# Patient Record
Sex: FEMALE | Race: WHITE | Employment: STUDENT | URBAN - METROPOLITAN AREA
[De-identification: names, ages, dates, MRNs, and addresses within clinical notes are randomized per-mention and may not be internally consistent; named-entity substitution may affect disease eponyms.]

---
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
## 2024-07-23 ENCOUNTER — TELEPHONE (OUTPATIENT)
Dept: HEMATOLOGY/ONCOLOGY | Facility: HOSPITAL | Age: 19
End: 2024-07-23

## 2024-07-23 NOTE — TELEPHONE ENCOUNTER
Pt is calling to schedule a new consult appt.    New Consult- Any doctor first available  Referred by- From [facility]  Reason- blood clotting disorder  Insurance-[employer] PPO    Please give pt a call back. Thank you.

## 2024-08-08 NOTE — PROGRESS NOTES
[facility] Note    Visit Diagnosis:  1. Abnormal laboratory test    2. Chronic fatigue        History of Present Illness: 19F referred by [facility] for blood clotting disorder. She states that she had a 42 day long period which prompted work up. Pelvs US normal. Brief episode of hematochezia but colonoscopy was normal. No epistaxis or gum bleeding.  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 She was told to consider RED-S syndrome. No fevers, night sweats or weight loss. No FH of bleeding, hemophilia or VWF.     Labs from OB/GYN  FVIII: 43% (normal )  VWF A (normal %), Ristocetin cofactor: 35 (normal )  PTT 32: normal  ATIII 109, Protein C 97, Protein S 89  MTHFR: heterozygote positive  Normal cardiolipins, B2GP  Normal prothrombin gene 17781E  Negative KATE  WBC 5.3, Hb 13, Plt 174, Ferritin 41    Review of Systems: 12 Point ROS was completed and pertinent positives are in the HPI    Current Outpatient Medications on File Prior to Visit   Medication Sig Dispense Refill    azelastine 0.1 % Nasal Solution       FLUoxetine 20 MG Oral Cap Take 1 capsule (20 mg total) by mouth daily.      albuterol 108 (90 Base) MCG/ACT Inhalation Aero Soln Inhale 2 puffs into the lungs 4 (four) times daily.      ADVAIR HFA 45-21 MCG/ACT Inhalation Aerosol Inhale 2 puffs into the lungs 2 (two) times daily.      montelukast 10 MG Oral Tab Take 1 tablet (10 mg total) by mouth daily.       No current facility-administered medications on file prior to visit.     Past Medical History:    Anxiety    Bloating    Blood in the stool    Chest pain    Food intolerance    Stress    Wheezing     Past Surgical History:   Procedure Laterality Date    Colonoscopy       Social History     Socioeconomic History    Marital status: Single   Tobacco Use    Smoking status: Never    Smokeless tobacco: Never   Vaping Use    Vaping status: Never Used   Substance and Sexual Activity    Alcohol  use: Never    Drug use: Never      Family History   Problem Relation Age of Onset    Other (Other) Mother         MTHFR    Cancer Paternal Grandmother         uterine    Diabetes Paternal Grandfather        Physical Exam  Height: 175.2 cm (5' 8.98\") (08/13 1357)  Weight: 67.1 kg (148 lb) (08/13 1357)  BSA (Calculated - sq m): 1.82 sq meters (08/13 1357)  Pulse: 68 (08/13 1357)  BP: 96/57 (08/13 1357)  Temp: 98 °F (36.7 °C) (08/13 1357)  Do Not Use - Resp Rate: --  SpO2: 95 % (08/13 1357)    General: NAD, AOX3  HEENT: clear op, mmm, no jvd, no scleral icterus  CV: RR  Extremities: No edema   Lungs: , no increased work of breathing  Abd: non distended   Neuro: CN: II-XII grossly intact    Results:  Lab Results   Component Value Date    WBC 5.5 06/01/2023    HGB 13.2 06/01/2023    HCT 39.2 06/01/2023    MCV 90.5 06/01/2023     06/01/2023     Lab Results   Component Value Date     06/01/2023    K 4.0 06/01/2023    CO2 24 06/01/2023     06/01/2023    BUN 9 06/01/2023    ALB 4.4 06/01/2023       No results found for: \"LDH\"    Radiology: reviewed     Assessment and Plan:  Low Ferritin  -Will repeat CBC, Fe studies today. She takes PO iron BID. She will start taking this with vitamin C  -If low, she would like to consider IV InFED.    MTHFR Heterozygote: we discussed that this done cause an elevated risk of thrombosis. Ok to take multivitamin with folic acid. No further work up.     Mildly low VWF Ag and Ristocetnin cofactor: both levels are above 30 inconsistent with VWD. Will repeat levels and multimers today    Single episode of menorrhagia: repeat PT/PTT/Von willebrand panel. Normal Pelvic US. If recurrent, can add PFA and platelet aggregation studies.    She is concerned about RED-S. Will refer to endocrinology.    RTC pending above    [facility]

## 2024-08-13 ENCOUNTER — OFFICE VISIT (OUTPATIENT)
Dept: HEMATOLOGY/ONCOLOGY | Facility: HOSPITAL | Age: 19
End: 2024-08-13
Attending: INTERNAL MEDICINE
Payer: COMMERCIAL

## 2024-08-13 VITALS
SYSTOLIC BLOOD PRESSURE: 96 MMHG | DIASTOLIC BLOOD PRESSURE: 57 MMHG | OXYGEN SATURATION: 95 % | BODY MASS INDEX: 21.92 KG/M2 | WEIGHT: 148 LBS | RESPIRATION RATE: 14 BRPM | TEMPERATURE: 98 F | HEART RATE: 68 BPM | HEIGHT: 68.98 IN

## 2024-08-13 DIAGNOSIS — R53.82 CHRONIC FATIGUE: ICD-10-CM

## 2024-08-13 DIAGNOSIS — R89.9 ABNORMAL LABORATORY TEST: Primary | ICD-10-CM

## 2024-08-13 PROBLEM — D64.89 OTHER SPECIFIED ANEMIAS: Status: ACTIVE | Noted: 2024-08-13

## 2024-08-13 PROCEDURE — 99245 OFF/OP CONSLTJ NEW/EST HI 55: CPT | Performed by: INTERNAL MEDICINE

## 2024-08-13 RX ORDER — AZELASTINE 1 MG/ML
SPRAY, METERED NASAL
COMMUNITY
Start: 2024-08-09

## 2024-08-13 NOTE — PROGRESS NOTES
Patient here as a new consult for blood clotting issues. States she had a 42 day long menstrual cycle earlier this year. Is having intermittent bleeding between menstrual cycles. Energy levels are low. Has occasional lightheadedness. Had some blood in the stool last year. Up to date with GI.

## 2024-08-15 ENCOUNTER — OFFICE VISIT (OUTPATIENT)
Dept: HEMATOLOGY/ONCOLOGY | Facility: HOSPITAL | Age: 19
End: 2024-08-15
Attending: INTERNAL MEDICINE
Payer: COMMERCIAL

## 2024-08-15 VITALS
HEART RATE: 64 BPM | RESPIRATION RATE: 14 BRPM | HEIGHT: 68.98 IN | BODY MASS INDEX: 22.04 KG/M2 | TEMPERATURE: 98 F | WEIGHT: 148.81 LBS | OXYGEN SATURATION: 98 % | DIASTOLIC BLOOD PRESSURE: 62 MMHG | SYSTOLIC BLOOD PRESSURE: 112 MMHG

## 2024-08-15 DIAGNOSIS — D64.89 ANEMIA DUE TO OTHER CAUSE, NOT CLASSIFIED: Primary | ICD-10-CM

## 2024-08-15 PROCEDURE — 96365 THER/PROPH/DIAG IV INF INIT: CPT

## 2024-08-15 NOTE — PROGRESS NOTES
Education Record    Learner:  Patient    Disease / Diagnosis:Iron deficiency    Barriers / Limitations:  None   Comments:    Method:  Brief focused and Reinforcement   Comments:    General Topics:  Procedure and Plan of care reviewed   Comments:    Outcome:  Shows understanding   Comments:    Pt received test dose and remaining infed without complication.  Pt discharged in stable condition.  Sent message to Dr Orr's nurse to see when pt to follow-up with MD-instructed pt to look for a [employer] message to be sent to her.

## 2024-08-20 ENCOUNTER — TELEPHONE (OUTPATIENT)
Dept: HEMATOLOGY/ONCOLOGY | Facility: HOSPITAL | Age: 19
End: 2024-08-20

## 2024-08-20 NOTE — TELEPHONE ENCOUNTER
Patient's mother is calling to schedule infusion appointment. She asked if she can get a call back today because tomorrow she will be at work.  Ying [phone]  Called 8/20/24. MP

## 2024-11-27 ENCOUNTER — NURSE ONLY (OUTPATIENT)
Dept: HEMATOLOGY/ONCOLOGY | Facility: HOSPITAL | Age: 19
End: 2024-11-27
Attending: INTERNAL MEDICINE
Payer: COMMERCIAL

## 2024-11-27 ENCOUNTER — TELEPHONE (OUTPATIENT)
Dept: HEMATOLOGY/ONCOLOGY | Facility: HOSPITAL | Age: 19
End: 2024-11-27

## 2024-11-27 DIAGNOSIS — D64.89 ANEMIA DUE TO OTHER CAUSE, NOT CLASSIFIED: Primary | ICD-10-CM

## 2024-11-27 LAB
BASOPHILS # BLD AUTO: 0.04 X10(3) UL (ref 0–0.2)
BASOPHILS NFR BLD AUTO: 0.5 %
DEPRECATED HBV CORE AB SER IA-ACNC: 172 NG/ML
EOSINOPHIL # BLD AUTO: 0.1 X10(3) UL (ref 0–0.7)
EOSINOPHIL NFR BLD AUTO: 1.3 %
ERYTHROCYTE [DISTWIDTH] IN BLOOD BY AUTOMATED COUNT: 12.8 %
HCT VFR BLD AUTO: 38.4 %
HGB BLD-MCNC: 13.2 G/DL
IMM GRANULOCYTES # BLD AUTO: 0.02 X10(3) UL (ref 0–1)
IMM GRANULOCYTES NFR BLD: 0.3 %
IRON SATN MFR SERPL: 24 %
IRON SERPL-MCNC: 58 UG/DL
LYMPHOCYTES # BLD AUTO: 1.96 X10(3) UL (ref 1–4)
LYMPHOCYTES NFR BLD AUTO: 26.4 %
MCH RBC QN AUTO: 30.8 PG (ref 26–34)
MCHC RBC AUTO-ENTMCNC: 34.4 G/DL (ref 31–37)
MCV RBC AUTO: 89.5 FL
MONOCYTES # BLD AUTO: 0.64 X10(3) UL (ref 0.1–1)
MONOCYTES NFR BLD AUTO: 8.6 %
NEUTROPHILS # BLD AUTO: 4.67 X10 (3) UL (ref 1.5–7.7)
NEUTROPHILS # BLD AUTO: 4.67 X10(3) UL (ref 1.5–7.7)
NEUTROPHILS NFR BLD AUTO: 62.9 %
PLATELET # BLD AUTO: 171 10(3)UL (ref 150–450)
RBC # BLD AUTO: 4.29 X10(6)UL
TOTAL IRON BINDING CAPACITY: 238 UG/DL (ref 250–425)
TRANSFERRIN SERPL-MCNC: 176 MG/DL (ref 250–380)
WBC # BLD AUTO: 7.4 X10(3) UL (ref 4–11)

## 2024-11-27 PROCEDURE — 36415 COLL VENOUS BLD VENIPUNCTURE: CPT

## 2024-11-27 PROCEDURE — 82728 ASSAY OF FERRITIN: CPT

## 2024-11-27 PROCEDURE — 83540 ASSAY OF IRON: CPT

## 2024-11-27 PROCEDURE — 85025 COMPLETE CBC W/AUTO DIFF WBC: CPT

## 2024-11-27 PROCEDURE — 83550 IRON BINDING TEST: CPT

## 2024-11-27 NOTE — TELEPHONE ENCOUNTER
LVM for patient. No iron needed per APN. Will cancel Friday's infusion appointment once patient confirms message has been received. MyChart sent as well.

## 2024-11-29 ENCOUNTER — APPOINTMENT (OUTPATIENT)
Dept: HEMATOLOGY/ONCOLOGY | Facility: HOSPITAL | Age: 19
End: 2024-11-29
Attending: INTERNAL MEDICINE
Payer: COMMERCIAL

## (undated) NOTE — ED AVS SNAPSHOT
Parent/Legal Guardian Access to the Online [employer] Record of a Patient 15to 16Years Old  Return completed form by Secure email to [place] [facility]/Medical Records Department: [email or web address].     Requirements and Procedures   Under [facility] ·  I understand that Hafsa Packer is intended as a secure online source of confidential medical information.  If I share my [employer] ID and password with another person, that person may be able to view my or my child’s health information, and health information a · This form does not substitute as an Authorization to Release health information to a designated proxy by any other method. The purpose of this Minor Proxy form is for access to the [employer] portal information.     By signing below, I acknowledge that I ha I have read and understand the requirements and procedures for accessing my child’s medical record information online as provided  on page one of this document titled, Parent/Legal Guardian Access to the Online MyChart Record of a Patient [address] to [address]